# Patient Record
Sex: MALE | Race: WHITE | NOT HISPANIC OR LATINO | URBAN - METROPOLITAN AREA
[De-identification: names, ages, dates, MRNs, and addresses within clinical notes are randomized per-mention and may not be internally consistent; named-entity substitution may affect disease eponyms.]

---

## 2024-10-20 ENCOUNTER — EMERGENCY (EMERGENCY)
Facility: HOSPITAL | Age: 29
LOS: 0 days | Discharge: HOME | End: 2024-10-20
Attending: STUDENT IN AN ORGANIZED HEALTH CARE EDUCATION/TRAINING PROGRAM
Payer: MEDICAID

## 2024-10-20 VITALS
TEMPERATURE: 98 F | SYSTOLIC BLOOD PRESSURE: 124 MMHG | HEART RATE: 96 BPM | OXYGEN SATURATION: 96 % | RESPIRATION RATE: 18 BRPM | DIASTOLIC BLOOD PRESSURE: 69 MMHG

## 2024-10-20 VITALS
TEMPERATURE: 99 F | RESPIRATION RATE: 20 BRPM | SYSTOLIC BLOOD PRESSURE: 106 MMHG | HEART RATE: 97 BPM | OXYGEN SATURATION: 99 % | DIASTOLIC BLOOD PRESSURE: 72 MMHG

## 2024-10-20 DIAGNOSIS — R55 SYNCOPE AND COLLAPSE: ICD-10-CM

## 2024-10-20 DIAGNOSIS — R00.0 TACHYCARDIA, UNSPECIFIED: ICD-10-CM

## 2024-10-20 DIAGNOSIS — S00.83XA CONTUSION OF OTHER PART OF HEAD, INITIAL ENCOUNTER: ICD-10-CM

## 2024-10-20 LAB
ABO RH CONFIRMATION: SIGNIFICANT CHANGE UP
ALBUMIN SERPL ELPH-MCNC: 3.6 G/DL — SIGNIFICANT CHANGE UP (ref 3.3–5)
ALP SERPL-CCNC: 79 U/L — SIGNIFICANT CHANGE UP (ref 40–120)
ALT FLD-CCNC: 23 U/L — SIGNIFICANT CHANGE UP (ref 12–78)
ANION GAP SERPL CALC-SCNC: 7 MMOL/L — SIGNIFICANT CHANGE UP (ref 5–17)
APTT BLD: 29 SEC — SIGNIFICANT CHANGE UP (ref 24.5–35.6)
AST SERPL-CCNC: 23 U/L — SIGNIFICANT CHANGE UP (ref 15–37)
BASOPHILS # BLD AUTO: 0.04 K/UL — SIGNIFICANT CHANGE UP (ref 0–0.2)
BASOPHILS NFR BLD AUTO: 0.4 % — SIGNIFICANT CHANGE UP (ref 0–2)
BILIRUB SERPL-MCNC: 0.3 MG/DL — SIGNIFICANT CHANGE UP (ref 0.2–1.2)
BLD GP AB SCN SERPL QL: SIGNIFICANT CHANGE UP
BUN SERPL-MCNC: 12 MG/DL — SIGNIFICANT CHANGE UP (ref 7–23)
CALCIUM SERPL-MCNC: 9 MG/DL — SIGNIFICANT CHANGE UP (ref 8.5–10.1)
CHLORIDE SERPL-SCNC: 113 MMOL/L — HIGH (ref 96–108)
CO2 SERPL-SCNC: 23 MMOL/L — SIGNIFICANT CHANGE UP (ref 22–31)
CREAT SERPL-MCNC: 1.17 MG/DL — SIGNIFICANT CHANGE UP (ref 0.5–1.3)
EGFR: 85 ML/MIN/1.73M2 — SIGNIFICANT CHANGE UP
EOSINOPHIL # BLD AUTO: 0.14 K/UL — SIGNIFICANT CHANGE UP (ref 0–0.5)
EOSINOPHIL NFR BLD AUTO: 1.3 % — SIGNIFICANT CHANGE UP (ref 0–6)
ETHANOL SERPL-MCNC: <10 MG/DL — SIGNIFICANT CHANGE UP (ref 0–10)
GLUCOSE SERPL-MCNC: 116 MG/DL — HIGH (ref 70–99)
HCT VFR BLD CALC: 37.9 % — LOW (ref 39–50)
HGB BLD-MCNC: 12.3 G/DL — LOW (ref 13–17)
IMM GRANULOCYTES NFR BLD AUTO: 0.3 % — SIGNIFICANT CHANGE UP (ref 0–0.9)
INR BLD: 1.14 RATIO — SIGNIFICANT CHANGE UP (ref 0.85–1.16)
LYMPHOCYTES # BLD AUTO: 2.14 K/UL — SIGNIFICANT CHANGE UP (ref 1–3.3)
LYMPHOCYTES # BLD AUTO: 20.2 % — SIGNIFICANT CHANGE UP (ref 13–44)
MCHC RBC-ENTMCNC: 29.1 PG — SIGNIFICANT CHANGE UP (ref 27–34)
MCHC RBC-ENTMCNC: 32.5 GM/DL — SIGNIFICANT CHANGE UP (ref 32–36)
MCV RBC AUTO: 89.6 FL — SIGNIFICANT CHANGE UP (ref 80–100)
MONOCYTES # BLD AUTO: 0.52 K/UL — SIGNIFICANT CHANGE UP (ref 0–0.9)
MONOCYTES NFR BLD AUTO: 4.9 % — SIGNIFICANT CHANGE UP (ref 2–14)
NEUTROPHILS # BLD AUTO: 7.7 K/UL — HIGH (ref 1.8–7.4)
NEUTROPHILS NFR BLD AUTO: 72.9 % — SIGNIFICANT CHANGE UP (ref 43–77)
PLATELET # BLD AUTO: 319 K/UL — SIGNIFICANT CHANGE UP (ref 150–400)
POTASSIUM SERPL-MCNC: 4.4 MMOL/L — SIGNIFICANT CHANGE UP (ref 3.5–5.3)
POTASSIUM SERPL-SCNC: 4.4 MMOL/L — SIGNIFICANT CHANGE UP (ref 3.5–5.3)
PROT SERPL-MCNC: 7.5 GM/DL — SIGNIFICANT CHANGE UP (ref 6–8.3)
PROTHROM AB SERPL-ACNC: 13.1 SEC — SIGNIFICANT CHANGE UP (ref 9.9–13.4)
RBC # BLD: 4.23 M/UL — SIGNIFICANT CHANGE UP (ref 4.2–5.8)
RBC # FLD: 13.9 % — SIGNIFICANT CHANGE UP (ref 10.3–14.5)
SODIUM SERPL-SCNC: 143 MMOL/L — SIGNIFICANT CHANGE UP (ref 135–145)
TROPONIN I, HIGH SENSITIVITY RESULT: 4.05 NG/L — SIGNIFICANT CHANGE UP
WBC # BLD: 10.57 K/UL — HIGH (ref 3.8–10.5)
WBC # FLD AUTO: 10.57 K/UL — HIGH (ref 3.8–10.5)

## 2024-10-20 PROCEDURE — 93005 ELECTROCARDIOGRAM TRACING: CPT

## 2024-10-20 PROCEDURE — 70450 CT HEAD/BRAIN W/O DYE: CPT | Mod: 26,MC

## 2024-10-20 PROCEDURE — 86900 BLOOD TYPING SEROLOGIC ABO: CPT

## 2024-10-20 PROCEDURE — 72125 CT NECK SPINE W/O DYE: CPT | Mod: MC

## 2024-10-20 PROCEDURE — 85730 THROMBOPLASTIN TIME PARTIAL: CPT

## 2024-10-20 PROCEDURE — 99285 EMERGENCY DEPT VISIT HI MDM: CPT

## 2024-10-20 PROCEDURE — 80307 DRUG TEST PRSMV CHEM ANLYZR: CPT

## 2024-10-20 PROCEDURE — 74177 CT ABD & PELVIS W/CONTRAST: CPT | Mod: 26,MC

## 2024-10-20 PROCEDURE — 80053 COMPREHEN METABOLIC PANEL: CPT

## 2024-10-20 PROCEDURE — 86901 BLOOD TYPING SEROLOGIC RH(D): CPT

## 2024-10-20 PROCEDURE — 86850 RBC ANTIBODY SCREEN: CPT

## 2024-10-20 PROCEDURE — 70450 CT HEAD/BRAIN W/O DYE: CPT | Mod: MC

## 2024-10-20 PROCEDURE — 71260 CT THORAX DX C+: CPT | Mod: 26,MC

## 2024-10-20 PROCEDURE — 84484 ASSAY OF TROPONIN QUANT: CPT

## 2024-10-20 PROCEDURE — 85025 COMPLETE CBC W/AUTO DIFF WBC: CPT

## 2024-10-20 PROCEDURE — 93010 ELECTROCARDIOGRAM REPORT: CPT

## 2024-10-20 PROCEDURE — 85610 PROTHROMBIN TIME: CPT

## 2024-10-20 PROCEDURE — 99285 EMERGENCY DEPT VISIT HI MDM: CPT | Mod: 25

## 2024-10-20 PROCEDURE — 82962 GLUCOSE BLOOD TEST: CPT

## 2024-10-20 PROCEDURE — 36415 COLL VENOUS BLD VENIPUNCTURE: CPT

## 2024-10-20 PROCEDURE — 74177 CT ABD & PELVIS W/CONTRAST: CPT | Mod: MC

## 2024-10-20 PROCEDURE — 71260 CT THORAX DX C+: CPT | Mod: MC

## 2024-10-20 PROCEDURE — 72125 CT NECK SPINE W/O DYE: CPT | Mod: 26,MC

## 2024-10-20 RX ORDER — SODIUM CHLORIDE 0.9 % (FLUSH) 0.9 %
1000 SYRINGE (ML) INJECTION ONCE
Refills: 0 | Status: COMPLETED | OUTPATIENT
Start: 2024-10-20 | End: 2024-10-20

## 2024-10-20 RX ADMIN — Medication 1000 MILLILITER(S): at 16:42

## 2024-10-20 NOTE — CONSULT NOTE ADULT - ATTENDING COMMENTS
A/P:  Suspected seizure activity  No acute trauma pathology to workup/exam  No acute trauma surgical intervention for pt  Reconsult prn  75 minutes of time spent on pt examination, review of relevant labs and radiologic studies, assured stabilization of pt, discussion with relevant services/providers for coordination of pt care and services, time is exclusive of resident and/or physician assistant teaching or supervision time

## 2024-10-20 NOTE — ED ADULT NURSE NOTE - CHIEF COMPLAINT QUOTE
BIB EMS S/P FALL. AS PER EMS" PT WAS AT [employer] FEST DRINKING, ONLINE FOR MORE DRINKS SUDDENLY FELL HIT HEAD AND SEIZED APROX LASTING 1 MINUTE. . VS ON SCENE 65/30, . 18G R WRIST IV WITH 1 LITER FLUIDS GIVEN PT BACK AT BASELINE. MD KATHLEEN AT TRIAGE. TRAUMA ALERT CALLED AT 1448. PT PLACED IN TRAUMA ROOM. EKG. NO SIGNIFICANT PMH.

## 2024-10-20 NOTE — ED PROVIDER NOTE - NSFOLLOWUPINSTRUCTIONS_ED_ALL_ED_FT
Please follow-up with your doctor(s) within the next 3 days, but see medical sooner if your symptoms persist or worsen.  Please call tomorrow for an appointment. Please also follow up with neurologist, call and schedule an appointment,    You were given a copy of your labs and/or imaging.  Please go-over these with your doctor(s).     If you have any worsening of symptoms or any other concerns please see your doctor or return to the ED immediately.    Please continue taking your home medications as directed    ANY PENDING RESULTS: You can access the FollowODECHealth Patient Portal offered by [facility] by registering at the following website: [email or web address]. By joining [email or web address]’s [employer] portal, you will also be able to view your health information using other applications (apps) compatible with our system.     Syncope    WHAT YOU NEED TO KNOW:    Syncope is also called fainting or passing out. Syncope is a sudden, temporary loss of consciousness, followed by a fall from a standing or sitting position. Syncope ranges from not serious to a sign of a more serious condition that needs to be treated. You can control some health conditions that cause syncope. Your healthcare providers can help you create a plan to manage syncope and prevent episodes.    DISCHARGE INSTRUCTIONS:    Seek care immediately if:     You are bleeding because you hit your head when you fainted.       You suddenly have double vision, difficulty speaking, numbness, and cannot move your arms or legs.      You have chest pain and trouble breathing.      You vomit blood or material that looks like coffee grounds.      You see blood in your bowel movement.    Contact your healthcare provider if:     You have new or worsening symptoms.      You have another syncope episode.      You have a headache, fast heartbeat, or feel too dizzy to stand up.      You have questions or concerns about your condition or care.    Medicines:     Medicines may be needed to help your heart pump strongly and regularly. Your healthcare provider may also make changes to any medicines that are causing syncope.       Take your medicine as directed. Contact your healthcare provider if you think your medicine is not helping or if you have side effects. Tell him or her if you are allergic to any medicine. Keep a list of the medicines, vitamins, and herbs you take. Include the amounts, and when and why you take them. Bring the list or the pill bottles to follow-up visits. Carry your medicine list with you in case of an emergency.    Follow up with your healthcare provider as directed: Write down your questions so you remember to ask them during your visits.     Manage syncope:     Keep a record of your syncope episodes. Include your symptoms and your activity before and after the episode. The record can help your healthcare provider find the cause of your syncope and help you manage episodes.      Sit or lie down when needed. This includes when you feel dizzy, your throat is getting tight, and your vision changes. Raise your legs above the level of your heart.      Take slow, deep breaths if you start to breathe faster with anxiety or fear. This can help decrease dizziness and the feeling that you might faint.       Check your blood pressure often. This is important if you take medicine to lower your blood pressure. Check your blood pressure when you are lying down and when you are standing. Ask how often to check during the day. Keep a record of your blood pressure numbers. Your healthcare provider may use the record to help plan your treatment.How to take a Blood Pressure         Prevent a syncope episode:     Move slowly and let yourself get used to one position before you move to another position. This is very important when you change from a lying or sitting position to a standing position. Take some deep breaths before you stand up from a lying position. Stand up slowly. Sudden movements may cause a fainting spell. Sit on the side of the bed or couch for a few minutes before you stand up. If you are on bedrest, try to be upright for about 2 hours each day, or as directed. Do not lock your legs if you are standing for a long period of time. Move your legs and bend your knees to keep blood flowing.      Follow your healthcare provider's recommendations. Your provider may recommend that you drink more liquids to prevent dehydration. You may also need to have more salt to keep your blood pressure from dropping too low and causing syncope. Your provider will tell you how much liquid and sodium to have each day. He or she will also tell you how much physical activity is safe for you. This will depend on what is causing your syncope.      Watch for signs of low blood sugar. These include hunger, nervousness, sweating, and fast or fluttery heartbeats. Talk with your healthcare provider about ways to keep your blood sugar level steady.      Do not strain if you are constipated. You may faint if you strain to have a bowel movement. Walking is the best way to get your bowels moving. Eat foods high in fiber to make it easier to have a bowel movement. Good examples are high-fiber cereals, beans, vegetables, and whole-grain breads. Prune juice may help make bowel movements softer.      Be careful in hot weather. Heat can cause a syncope episode. Limit activity done outside on hot days. Physical activity in hot weather can lead to dehydration. This can cause an episode.

## 2024-10-20 NOTE — ED PROVIDER NOTE - CLINICAL SUMMARY MEDICAL DECISION MAKING FREE TEXT BOX
presentation concerning for ICH, syncope with convulsions, dehydration, electrolyte abnormality.  Plan for EKG, blood sugar, labs, IV hydration, trauma alert with pan CT, monitor and reassess presentation concerning for ICH, syncope with convulsions, dehydration, electrolyte abnormality.  Plan for EKG, blood sugar, labs, IV hydration, trauma alert with pan CT, monitor and reassess. EKG shows sinus tachycardia, rate 111, normal axis, normal intervals, no ST elevations or depressions.  BG within normal limits.  Pan CT shows no acute actionable findings.  WBC 10, troponin within normal limits, creatinine 1.17, EtOH less than 10.  Patient is well-appearing, tolerating p.o. after receiving 1 L normal saline.  Patient cleared by trauma surgery as he was a trauma alert on arrival.  Is neurologically intact, ambulating with normal gait and hemodynamically stable.  all results reviewed at bedside, patient reports he feels well and is comfortable with discharge. Presentation most concerning for syncope with convulsions, in the setting of patient being neurologically intact at this time will discharge home with follow-up instructions to see neurology and primary care doctor, given strict return precautions and DC home in stable condition

## 2024-10-20 NOTE — ED ADULT TRIAGE NOTE - CHIEF COMPLAINT QUOTE
BIB EMS S/P FALL. AS PER EMS" PT WAS AT [employer] [facility], ONLINE FOR MORE DRINKS SUDDENLY FELL HIT HEAD AND SEIZED APROX LASTING 1 MINUTE. . VS ON SCENE 65/30, . 18G R WRIST IV WITH 1 LITER FLUIDS GIVEN PT BACK AT BASELINE. MD KATHLEEN AT TRIAGE. TRAUMA ALERT CALLED AT 1448. PT PLACED IN TRAUMA ROOM. EKG. NO SIGNIFICANT PMH.

## 2024-10-20 NOTE — CONSULT NOTE ADULT - SUBJECTIVE AND OBJECTIVE BOX
CC:Patient is a 29y old  Male who presents with a chief complaint of syncope    Subjective:  30 yo M BBEMs after syncope earlier today. As per patient he [place] day at festival and felt faint and fell from standing height while waiting in line. Got back up and fell again. + LOC, + headstrike. Pt did not drink water or eat food today. Also mentions he was smoking marijuana today. Denies any pain at moment of exam.  Pt seen and examined at bedside. Pt is AAOx3, pt in no acute distress. Pt denied c/o fever, chills, chest pain, SOB, abd pain, N/V/D, extremity pain or dysfunction, hemoptysis, hematemesis, hematuria, hematochezia headache, diplopia, vertigo, dizziness Pt tolerating diet, (+) void, (+) ambulation, (+) bowel function    ROS: as above    PMH: denies  PSH: denies  No Known Allergies    SH: social drinker, smokes marijuana, denies any drug use  FH: Denies    Vital Signs Last 24 Hrs  T(C): 36.6 (20 Oct 2024 15:48), Max: 36.6 (20 Oct 2024 15:48)  T(F): 97.9 (20 Oct 2024 15:48), Max: 97.9 (20 Oct 2024 15:48)  HR: 96 (20 Oct 2024 15:48) (96 - 96)  BP: 124/69 (20 Oct 2024 15:48) (124/69 - 124/69)  BP(mean): --  RR: 18 (20 Oct 2024 15:48) (18 - 18)  SpO2: 96% (20 Oct 2024 15:48) (96% - 96%)    Parameters below as of 20 Oct 2024 15:48  Patient On (Oxygen Delivery Method): room air        Labs:                          12.3   10.57 )-----------( 319      ( 20 Oct 2024 15:55 )             37.9     CBC Full  -  ( 20 Oct 2024 15:55 )  WBC Count : 10.57 K/uL  RBC Count : 4.23 M/uL  Hemoglobin : 12.3 g/dL  Hematocrit : 37.9 %  Platelet Count - Automated : 319 K/uL  Mean Cell Volume : 89.6 fl  Mean Cell Hemoglobin : 29.1 pg  Mean Cell Hemoglobin Concentration : 32.5 gm/dL  Auto Neutrophil # : 7.70 K/uL  Auto Lymphocyte # : 2.14 K/uL  Auto Monocyte # : 0.52 K/uL  Auto Eosinophil # : 0.14 K/uL  Auto Basophil # : 0.04 K/uL  Auto Neutrophil % : 72.9 %  Auto Lymphocyte % : 20.2 %  Auto Monocyte % : 4.9 %  Auto Eosinophil % : 1.3 %  Auto Basophil % : 0.4 %    10-20    143  |  113[H]  |  12  ----------------------------<  116[H]  4.4   |  23  |  1.17    Ca    9.0      20 Oct 2024 15:55    TPro  7.5  /  Alb  3.6  /  TBili  0.3  /  DBili  x   /  AST  23  /  ALT  23  /  AlkPhos  79  10-20    LIVER FUNCTIONS - ( 20 Oct 2024 15:55 )  Alb: 3.6 g/dL / Pro: 7.5 gm/dL / ALK PHOS: 79 U/L / ALT: 23 U/L / AST: 23 U/L / GGT: x           PT/INR - ( 20 Oct 2024 15:55 )   PT: 13.1 sec;   INR: 1.14 ratio         PTT - ( 20 Oct 2024 15:55 )  PTT:29.0 sec      Meds:      Radiology:  < from: CT Chest w/ IV Cont (10.20.24 @ 16:15) >  No evidence of acute intrathoracic or intra-abdominal traumatic injury.    < end of copied text >    < from: CT Cervical Spine No Cont (10.20.24 @ 16:08) >  CT HEAD:  No evidence of acute intracranial pathology.    CT CERVICAL SPINE:  No acute fracture or traumatic subluxation.    < end of copied text >          Physical exam:  GCS of 15, AOx4  Airway is patent  Breathing is symmetric and unlabored  Neuro: CNII-XII grossly intact  Psych: normal affect  HEENT: Normocephalic, atraumatic, ALFREDO, EOM wnl, no otorrhea or hemotympanum b/l, no epistaxis or d/c b/l nares, no craniofacial bony pathology or tenderness b/l, mild abrasion to left forehead, non bleeding  Neck: No crepitus, no ecchymosis, no hematoma, to exam, no JVD, no tracheal deviation  Cspine/thoracolumbrosacral spine: no gross bony pathology or tenderness to exam  Cardiovascular: S1S2 Present  Chest: no gross rib pathology or tenderness to exam. No sternal pathology or tenderness to exam. No crepitus, no ecchymosis, no hematoma. No penetrating thorcoabdominal trauma  Respiratory: Respiratory Effort normal; no wheezes, rales or rhonchi to exam  ABD: soft, nontender, non distended, no rebound, no guarding, no rigidity, no skin changes to exam. No pelvic instability to exam, no skin changes  Musculoskeletal: Pt has palpable b/l radial, femoral, dorsalis pedis pulses. All digits are warm and well perfused. No gross long bone pathology or tenderness to exam. Pt demonstrates grossly intact sensoromotor function. Pt has good capillary refill to digits, no calf edema or tenderness to exam. Left knee abrasion, non bleeding  Skin: no lesions or rashes to exam       CC:Patient is a 29y old  Male who presents with a chief complaint of syncope  Trauma ALert, notifed 10/20/24 630pm, seen by Surgery 635pm    Subjective:  30 yo M BBEMs after syncope earlier today. As per patient he was [place] all day at festival and felt faint and fell from standing height while waiting in line. Got back up and fell again. + LOC, + headstrike. Pt did not drink water or eat food today. Also mentions he was smoking marijuana today. Denies any pain at moment of exam.  Pt seen and examined at bedside. Pt is AAOx3, pt in no acute distress. Pt denied c/o fever, chills, chest pain, SOB, abd pain, N/V/D, extremity pain or dysfunction, hemoptysis, hematemesis, hematuria, hematochezia headache, diplopia, vertigo, dizziness Pt tolerating diet, (+) void, (+) ambulation, (+) bowel function    ROS: as above    PMH: denies  PSH: denies  No Known Allergies    SH: social drinker, smokes marijuana, denies any drug use  FH: Denies    Vital Signs Last 24 Hrs  T(C): 36.6 (20 Oct 2024 15:48), Max: 36.6 (20 Oct 2024 15:48)  T(F): 97.9 (20 Oct 2024 15:48), Max: 97.9 (20 Oct 2024 15:48)  HR: 96 (20 Oct 2024 15:48) (96 - 96)  BP: 124/69 (20 Oct 2024 15:48) (124/69 - 124/69)  BP(mean): --  RR: 18 (20 Oct 2024 15:48) (18 - 18)  SpO2: 96% (20 Oct 2024 15:48) (96% - 96%)    Parameters below as of 20 Oct 2024 15:48  Patient On (Oxygen Delivery Method): room air        Labs:                          12.3   10.57 )-----------( 319      ( 20 Oct 2024 15:55 )             37.9     CBC Full  -  ( 20 Oct 2024 15:55 )  WBC Count : 10.57 K/uL  RBC Count : 4.23 M/uL  Hemoglobin : 12.3 g/dL  Hematocrit : 37.9 %  Platelet Count - Automated : 319 K/uL  Mean Cell Volume : 89.6 fl  Mean Cell Hemoglobin : 29.1 pg  Mean Cell Hemoglobin Concentration : 32.5 gm/dL  Auto Neutrophil # : 7.70 K/uL  Auto Lymphocyte # : 2.14 K/uL  Auto Monocyte # : 0.52 K/uL  Auto Eosinophil # : 0.14 K/uL  Auto Basophil # : 0.04 K/uL  Auto Neutrophil % : 72.9 %  Auto Lymphocyte % : 20.2 %  Auto Monocyte % : 4.9 %  Auto Eosinophil % : 1.3 %  Auto Basophil % : 0.4 %    10-20    143  |  113[H]  |  12  ----------------------------<  116[H]  4.4   |  23  |  1.17    Ca    9.0      20 Oct 2024 15:55    TPro  7.5  /  Alb  3.6  /  TBili  0.3  /  DBili  x   /  AST  23  /  ALT  23  /  AlkPhos  79  10-20    LIVER FUNCTIONS - ( 20 Oct 2024 15:55 )  Alb: 3.6 g/dL / Pro: 7.5 gm/dL / ALK PHOS: 79 U/L / ALT: 23 U/L / AST: 23 U/L / GGT: x           PT/INR - ( 20 Oct 2024 15:55 )   PT: 13.1 sec;   INR: 1.14 ratio         PTT - ( 20 Oct 2024 15:55 )  PTT:29.0 sec      Meds:      Radiology:  < from: CT Chest w/ IV Cont (10.20.24 @ 16:15) >  No evidence of acute intrathoracic or intra-abdominal traumatic injury.    < end of copied text >    < from: CT Cervical Spine No Cont (10.20.24 @ 16:08) >  CT HEAD:  No evidence of acute intracranial pathology.    CT CERVICAL SPINE:  No acute fracture or traumatic subluxation.    < end of copied text >          Physical exam:  GCS of 15, AOx4  Airway is patent  Breathing is symmetric and unlabored  Neuro: CNII-XII grossly intact  Psych: normal affect  HEENT: Normocephalic, atraumatic, ALFREDO, EOM wnl, no otorrhea or hemotympanum b/l, no epistaxis or d/c b/l nares, no craniofacial bony pathology or tenderness b/l, mild abrasion to left forehead, non bleeding  Neck: No crepitus, no ecchymosis, no hematoma, to exam, no JVD, no tracheal deviation  Cspine/thoracolumbrosacral spine: no gross bony pathology or tenderness to exam  Cardiovascular: S1S2 Present  Chest: no gross rib pathology or tenderness to exam. No sternal pathology or tenderness to exam. No crepitus, no ecchymosis, no hematoma. No penetrating thorcoabdominal trauma  Respiratory: Respiratory Effort normal; no wheezes, rales or rhonchi to exam  ABD: soft, nontender, non distended, no rebound, no guarding, no rigidity, no skin changes to exam. No pelvic instability to exam, no skin changes  Musculoskeletal: Pt has palpable b/l radial, femoral, dorsalis pedis pulses. All digits are warm and well perfused. No gross long bone pathology or tenderness to exam. Pt demonstrates grossly intact sensoromotor function. Pt has good capillary refill to digits, no calf edema or tenderness to exam. Left knee abrasion, non bleeding  Skin: no lesions or rashes to exam       CC:Patient is a 29y old  Male who presents with a chief complaint of syncope  Trauma ALert, notifed 10/20/24 358pm, seen by Surgery 405pm    Subjective:  30 yo M BBEMs after syncope earlier today. As per patient he [place] day at festival and felt faint and fell from standing height while waiting in line. Got back up and fell again. + LOC, + headstrike. Pt did not drink water or eat food today. Also mentions he was smoking marijuana today. Denies any pain at moment of exam.  Pt seen and examined at bedside. Pt is AAOx3, pt in no acute distress. Pt denied c/o fever, chills, chest pain, SOB, abd pain, N/V/D, extremity pain or dysfunction, hemoptysis, hematemesis, hematuria, hematochezia headache, diplopia, vertigo, dizziness Pt tolerating diet, (+) void, (+) ambulation, (+) bowel function    ROS: as above    PMH: denies  PSH: denies  No Known Allergies    SH: social drinker, smokes marijuana, denies any drug use  FH: Denies    Vital Signs Last 24 Hrs  T(C): 36.6 (20 Oct 2024 15:48), Max: 36.6 (20 Oct 2024 15:48)  T(F): 97.9 (20 Oct 2024 15:48), Max: 97.9 (20 Oct 2024 15:48)  HR: 96 (20 Oct 2024 15:48) (96 - 96)  BP: 124/69 (20 Oct 2024 15:48) (124/69 - 124/69)  BP(mean): --  RR: 18 (20 Oct 2024 15:48) (18 - 18)  SpO2: 96% (20 Oct 2024 15:48) (96% - 96%)    Parameters below as of 20 Oct 2024 15:48  Patient On (Oxygen Delivery Method): room air        Labs:                          12.3   10.57 )-----------( 319      ( 20 Oct 2024 15:55 )             37.9     CBC Full  -  ( 20 Oct 2024 15:55 )  WBC Count : 10.57 K/uL  RBC Count : 4.23 M/uL  Hemoglobin : 12.3 g/dL  Hematocrit : 37.9 %  Platelet Count - Automated : 319 K/uL  Mean Cell Volume : 89.6 fl  Mean Cell Hemoglobin : 29.1 pg  Mean Cell Hemoglobin Concentration : 32.5 gm/dL  Auto Neutrophil # : 7.70 K/uL  Auto Lymphocyte # : 2.14 K/uL  Auto Monocyte # : 0.52 K/uL  Auto Eosinophil # : 0.14 K/uL  Auto Basophil # : 0.04 K/uL  Auto Neutrophil % : 72.9 %  Auto Lymphocyte % : 20.2 %  Auto Monocyte % : 4.9 %  Auto Eosinophil % : 1.3 %  Auto Basophil % : 0.4 %    10-20    143  |  113[H]  |  12  ----------------------------<  116[H]  4.4   |  23  |  1.17    Ca    9.0      20 Oct 2024 15:55    TPro  7.5  /  Alb  3.6  /  TBili  0.3  /  DBili  x   /  AST  23  /  ALT  23  /  AlkPhos  79  10-20    LIVER FUNCTIONS - ( 20 Oct 2024 15:55 )  Alb: 3.6 g/dL / Pro: 7.5 gm/dL / ALK PHOS: 79 U/L / ALT: 23 U/L / AST: 23 U/L / GGT: x           PT/INR - ( 20 Oct 2024 15:55 )   PT: 13.1 sec;   INR: 1.14 ratio         PTT - ( 20 Oct 2024 15:55 )  PTT:29.0 sec      Meds:      Radiology:  < from: CT Chest w/ IV Cont (10.20.24 @ 16:15) >  No evidence of acute intrathoracic or intra-abdominal traumatic injury.    < end of copied text >    < from: CT Cervical Spine No Cont (10.20.24 @ 16:08) >  CT HEAD:  No evidence of acute intracranial pathology.    CT CERVICAL SPINE:  No acute fracture or traumatic subluxation.    < end of copied text >          Physical exam:  GCS of 15, AOx4  Airway is patent  Breathing is symmetric and unlabored  Neuro: CNII-XII grossly intact  Psych: normal affect  HEENT: Normocephalic, atraumatic, ALFREDO, EOM wnl, no otorrhea or hemotympanum b/l, no epistaxis or d/c b/l nares, no craniofacial bony pathology or tenderness b/l, mild abrasion to left forehead, non bleeding  Neck: No crepitus, no ecchymosis, no hematoma, to exam, no JVD, no tracheal deviation  Cspine/thoracolumbrosacral spine: no gross bony pathology or tenderness to exam  Cardiovascular: S1S2 Present  Chest: no gross rib pathology or tenderness to exam. No sternal pathology or tenderness to exam. No crepitus, no ecchymosis, no hematoma. No penetrating thorcoabdominal trauma  Respiratory: Respiratory Effort normal; no wheezes, rales or rhonchi to exam  ABD: soft, nontender, non distended, no rebound, no guarding, no rigidity, no skin changes to exam. No pelvic instability to exam, no skin changes  Musculoskeletal: Pt has palpable b/l radial, femoral, dorsalis pedis pulses. All digits are warm and well perfused. No gross long bone pathology or tenderness to exam. Pt demonstrates grossly intact sensoromotor function. Pt has good capillary refill to digits, no calf edema or tenderness to exam. Left knee abrasion, non bleeding  Skin: no lesions or rashes to exam

## 2024-10-20 NOTE — ED ADULT NURSE NOTE - NSFALLRISKINTERV_ED_ALL_ED

## 2024-10-20 NOTE — ED PROVIDER NOTE - CARE PROVIDER_API CALL
Soraya Flores  Neurology  [address] [facility], [place] [address]  Phone: [phone]  Fax: [phone]  Follow Up Time:

## 2024-10-20 NOTE — ED ADULT NURSE NOTE - OBJECTIVE STATEMENT
30 y/o male pt presents to ED s/p syncope and fall. pt reports drinking 4 beers and smoking marijuana today, was standing outside when he became dizzy and fell. pt remembers waking up, standing up and becoming dizzy again before falling a second time. pt presents to ED with abrasions to left knee and left forehead. c-collar placed by EMS. EMS placed 18G IV in R wrist. pt taken to trauma room, placed on cardiac monitor, labs drawn, pt taken to CT scan. pt a&ox4 on eval, breathing even and unlabored, moves all extremities.

## 2024-10-20 NOTE — ED PROVIDER NOTE - PHYSICAL EXAMINATION
Constitutional: well appearing, NAD AAOx3  Eyes: EOMI, PERRL  Head: Normocephalic +frontal hematoma with abrasion  Mouth: no airway obstruction, posterior oropharynx clear without erythema or exudate  Neck: supple  Cardiac: regular rate and rhythm, no MRG, no chest wall tenderness  Resp: Lungs CTAB  GI: Abd s/nt/nd  Neuro: CN2-12 intact, strength 5/5x4, sensation grossly intact  Skin: No rashes  MSK:   No midline tenderness of CTL spine, no obvious deformity or tenderness of extremities x 4

## 2024-10-20 NOTE — ED PROVIDER NOTE - PATIENT PORTAL LINK FT
You can access the FollowMyHealth Patient Portal offered by [facility] by registering at the following website: [email or web address]. By joining [email or web address]’s FollowMyHealth portal, you will also be able to view your health information using other applications (apps) compatible with our system.

## 2024-10-20 NOTE — ED PROVIDER NOTE - PROGRESS NOTE DETAILS
Labs and imaging performed and reviewed.  White blood cell count 10.57, no other actionable findings.  [place] scans negative for acute traumatic injuries.  Patient evaluated by trauma team.  No acute intervention at this time.  Patient made aware of all findings.  Discussed with patient how he most likely did not have a seizure and had syncope with convulsions.  Patient reassessed, feels much better at this time.  Ambulating, tolerating p.o.  Stable for discharge home.  Will give neurology follow-up outpatient.  Strict return precautions to ED were discussed.  Patient verbally expresses understanding and agrees to plan.  All questions concerns were addressed at this time. - Radha Smith PA-C

## 2024-10-20 NOTE — ED PROVIDER NOTE - OBJECTIVE STATEMENT
29yoM otherwise healthy presents from festival s/p fall +HS and witnessed seizure activity, no hx seizure disorder. Per EMS the seizure activity lasted for approximately 1 minute, on arrival patient was hypotensive which improved after IV fluid bolus, is now ANO x 4.  Patient endorses drinking 4 beers and vaping THC at the festival, states that he felt very lightheaded and then passed out.  At this time endorses a hematoma left frontal, denies headache, vision changes, numbness tingling's, chest pain, shortness of breath, nausea vomiting abdominal pain, back pain, urinary symptoms 29yoM otherwise healthy presents from festival s/p fall +HS and possible seizure activity, no hx seizure disorder. Per EMS the seizure activity lasted for approximately 1 minute then pt was awake and acting normally, on scene patient was hypotensive which improved after IV fluid bolus, is now AAO x 4.  Patient endorses drinking 4 beers and vaping THC at the festival, states that he felt very lightheaded and then passed out.  At this time endorses a hematoma left frontal, denies headache, vision changes, numbness tingling weakness, chest pain, shortness of breath, nausea vomiting abdominal pain, back pain, urinary symptoms

## 2025-02-07 NOTE — CONSULT NOTE ADULT - ASSESSMENT
30 yo M s/p syncope today after alcohol and marijuana at festival    No injuries on pan scan    P:  - No injuries noted  - Dispo per ED  - Bacitracin to knee wound     Plan discussed with Dr Moss decreased balance during turns/decreased safety awareness/losing balance